# Patient Record
Sex: FEMALE | Race: OTHER | HISPANIC OR LATINO | URBAN - METROPOLITAN AREA
[De-identification: names, ages, dates, MRNs, and addresses within clinical notes are randomized per-mention and may not be internally consistent; named-entity substitution may affect disease eponyms.]

---

## 2018-03-27 ENCOUNTER — EMERGENCY (EMERGENCY)
Facility: HOSPITAL | Age: 33
LOS: 1 days | Discharge: HOME | End: 2018-03-27
Attending: EMERGENCY MEDICINE | Admitting: EMERGENCY MEDICINE
Payer: COMMERCIAL

## 2018-03-27 VITALS
RESPIRATION RATE: 18 BRPM | HEART RATE: 66 BPM | TEMPERATURE: 98 F | OXYGEN SATURATION: 97 % | DIASTOLIC BLOOD PRESSURE: 72 MMHG | SYSTOLIC BLOOD PRESSURE: 122 MMHG

## 2018-03-27 VITALS
OXYGEN SATURATION: 100 % | TEMPERATURE: 98 F | HEART RATE: 86 BPM | DIASTOLIC BLOOD PRESSURE: 99 MMHG | RESPIRATION RATE: 16 BRPM | SYSTOLIC BLOOD PRESSURE: 158 MMHG

## 2018-03-27 LAB
APPEARANCE UR: CLEAR — SIGNIFICANT CHANGE UP
BACTERIA # UR AUTO: ABNORMAL /HPF
BILIRUB UR-MCNC: NEGATIVE — SIGNIFICANT CHANGE UP
COLOR SPEC: YELLOW — SIGNIFICANT CHANGE UP
DIFF PNL FLD: ABNORMAL
EPI CELLS # UR: SIGNIFICANT CHANGE UP /HPF
GLUCOSE UR QL: NEGATIVE — SIGNIFICANT CHANGE UP
HCG UR QL: NEGATIVE — SIGNIFICANT CHANGE UP
KETONES UR-MCNC: ABNORMAL
LEUKOCYTE ESTERASE UR-ACNC: ABNORMAL
NITRITE UR-MCNC: NEGATIVE — SIGNIFICANT CHANGE UP
PH UR: 5.5 — SIGNIFICANT CHANGE UP (ref 5–8)
PROT UR-MCNC: SIGNIFICANT CHANGE UP
RBC CASTS # UR COMP ASSIST: SIGNIFICANT CHANGE UP /HPF (ref 0–2)
SP GR SPEC: 1.03 — HIGH (ref 1.01–1.02)
UROBILINOGEN FLD QL: NEGATIVE — SIGNIFICANT CHANGE UP
WBC UR QL: SIGNIFICANT CHANGE UP /HPF (ref 0–5)

## 2018-03-27 PROCEDURE — 81025 URINE PREGNANCY TEST: CPT

## 2018-03-27 PROCEDURE — 99284 EMERGENCY DEPT VISIT MOD MDM: CPT | Mod: 25

## 2018-03-27 PROCEDURE — 81001 URINALYSIS AUTO W/SCOPE: CPT

## 2018-03-27 PROCEDURE — 87086 URINE CULTURE/COLONY COUNT: CPT

## 2018-03-27 PROCEDURE — 76770 US EXAM ABDO BACK WALL COMP: CPT

## 2018-03-27 PROCEDURE — 76770 US EXAM ABDO BACK WALL COMP: CPT | Mod: 26

## 2018-03-27 RX ORDER — ACETAMINOPHEN 500 MG
650 TABLET ORAL ONCE
Qty: 0 | Refills: 0 | Status: COMPLETED | OUTPATIENT
Start: 2018-03-27 | End: 2018-03-27

## 2018-03-27 RX ADMIN — Medication 650 MILLIGRAM(S): at 04:12

## 2018-03-27 NOTE — ED PROVIDER NOTE - OBJECTIVE STATEMENT
32F with no PMH presenting with right flank pain x4 hrs radiating to groin with associated frequency, urgency, difficulty urinating. States that she feels like she has not urinated much in the past few days. Denies fever, chills, cp, sob, n/v/d, dizziness, headache, vaginal bleeding or discharge

## 2018-03-27 NOTE — ED PROVIDER NOTE - PROGRESS NOTE DETAILS
Maria Luisa: patient stating pain improved Maria Luisa: patient stating pain improved. US significant for kidney stone. Patient made aware. Stable for discharge

## 2018-03-27 NOTE — ED PROCEDURE NOTE - PROCEDURE ADDITIONAL DETAILS
POCUS of kidneys performed. Right kidney with minimal hydronephrosis. No evidence of left kidney hydronephrosis. Renal US to follow.

## 2018-03-27 NOTE — ED ADULT NURSE NOTE - OBJECTIVE STATEMENT
pt c/o "right sided flank pain that started at 2400 tonight and got progressively worse. I took advil and took a hot shower with no relief. For the past few days I havent been urinating. Now I have urgency/frequency when I urinate." pt denies lightheadedness, dizziness, chest pain, SOB, n/v/d, any burning/itching with urination at present.

## 2018-03-27 NOTE — ED ADULT NURSE NOTE - CHPI ED SYMPTOMS NEG
no abdominal distension/no burning urination/no vomiting/no hematuria/no fever/no nausea/no blood in stool/no chills/no diarrhea

## 2018-03-27 NOTE — ED PROVIDER NOTE - ATTENDING CONTRIBUTION TO CARE
Attending MD Vigil:  I personally have seen and examined this patient.  Resident note reviewed and agree on plan of care and except where noted.  See MDM for details.

## 2018-03-27 NOTE — ED PROVIDER NOTE - MEDICAL DECISION MAKING DETAILS
32F presenting with difficulty urinating, frequency, urgency. Likely UTI. Unlikely pyelo given benign exam, no fever. Will order ua, uc, reassess 32F presenting with difficulty urinating, frequency, urgency. Likely UTI. Unlikely pyelo given benign exam, no fever. Will order ua, uc, reassess    Yaritza CLEMENT: 31 y/o female w/o PMH here with acute R flank pain. Patient reports acute onset of pain described as throbbing while at rest that she never felt before and is associated with urinary frequency. Pain is not worsened or improved by anything. Reports mildly dereased UOP for the past 3 days. Denies fever, chills. dysuria, trauma, vag bleeding, vag D/C, hx of STD, abd distention or skin rash. Exam shows a female  in mild discomfort with R CVA tenderness and distractable RLQ tenderness w/o guarding or rebound. Lungs CTAB. CArdiac S1S2 noted, RRR. Abd soft and nontender and nondistended. No LE edema. Consider UTI. Pyelo, Kidney stone or Ovarian pathology. Eval for Pregnancy. Plan UA, US renal and UCG and pain control. Reassess

## 2018-03-28 LAB
CULTURE RESULTS: NO GROWTH — SIGNIFICANT CHANGE UP
SPECIMEN SOURCE: SIGNIFICANT CHANGE UP

## 2020-10-28 NOTE — ED ADULT NURSE NOTE - NS ED NURSE LEVEL OF CONSCIOUSNESS SPEECH
Speaking Coherently O-L Flap Text: The defect edges were debeveled with a #15 scalpel blade.  Given the location of the defect, shape of the defect and the proximity to free margins an O-L flap was deemed most appropriate.  Using a sterile surgical marker, an appropriate advancement flap was drawn incorporating the defect and placing the expected incisions within the relaxed skin tension lines where possible.    The area thus outlined was incised deep to adipose tissue with a #15 scalpel blade.  The skin margins were undermined to an appropriate distance in all directions utilizing iris scissors.

## 2023-01-10 NOTE — ED PROVIDER NOTE - CPE EDP NEURO NORM
Rico De presents today for routine OB visit at 33w6d  Blood Pressure: 122/76  Vh=912 kg (258 lb 12 8 oz); Body mass index is 47 34 kg/m² ; TWG=-2 359 kg (-5 lb 3 2 oz)  Fetal Heart Rate: 140  Abdomen: gravid, soft, non-tender  NST is reactive now  She reports no complaints  Reports occasional mild uterine contractions  Denies vaginal bleeding or leaking of fluid  Reports adequate fetal movement of at least 10 movements in 2 hours once daily  Scheduled for ultrasound 1/31/23  Reviewed premature labor precautions and fetal kick counts  Advised to continue medications and return in 3 days  Current Outpatient Medications   Medication Instructions   • aspirin (ECOTRIN LOW STRENGTH) 162 mg, Oral, Daily   • labetalol (NORMODYNE) 800 mg, Oral, Every 8 hours   • NIFEdipine (PROCARDIA XL) 30 mg, Oral, Daily   • Prenatal Vit-Fe Fumarate-FA (Prenatal Vitamin) 27-0 8 MG TABS 1 tablet, Oral, Daily       Laboratory workup: initial OB labs (done 7/16/22); 28 week labs (done 11/27/22)    Genetic Screening: NIPS negative, MSAFP negative    Vaccinations: influenza (declined 9/12/22);  Tdap (declined 12/13/22); COVID-19 (recommended 7/12/22, declined 7/25/22)    Postpartum contraception: desires surgical sterilization (Doreen Joslyn signed 11/29/22)    Fetal Ultrasounds:  7/12/22 (7w6d) EDC confirmed  8/17/22 (13w0d) NT WNL  9/9/22 (16w2d) CL=4 40cm  9/26/22 (18w5d) CL=4 04cm  10/4/22 (19w6d) no previa, CL=4 13cm, marshall WNL w/missed views  10/18/22 (21w6d) CL=4 69cm  12/13/22 (29w6d) EFW=20%, AC=15%, DG=11 8cm, marshall WNL w/missed views  1/10/23 (33w6d) EFW=32%, AC=36%, DG=13 7cm, marshall WNL w/missed views, vertex      G7 Problems (from 07/12/22 to present)     Problem Noted Resolved    Abnormal glucola 7/25/2022 by JESSE Oneal No    Overview Addendum 11/29/2022  9:09 AM by JESSE Oneal     Needs 3h GTT - done WNL  Needs repeat 3h GTT @28 weeks - done WNL         Rubella non-immune 7/19/2022 by JESSE Brown No    Overview Signed 7/19/2022 11:46 AM by JESSE Dill     Needs postpartum MMR         Hx of preeclampsia 7/12/2022 by JESSE Brwon No    Overview Addendum 7/25/2022 10:57 AM by JESSE Dill     Needs baseline PEC labs - done WNL  Needs LDASA tx - taking         Chronic hypertension during pregnancy 7/12/2022 by JESSE Brown No    Overview Addendum 12/30/2022  9:20 AM by JESSE Brown     Needs baseline PEC labs - done WNL  Needs LDASA tx  Switched from HCTZ to Labetalol 200mg BID on 7/12/22  Increased Labetalol to 400mg BID on 7/25/22  Increased Labetalol to 600mg BID on 7/29/22  Increased Labetalol to 800mg BID on 8/2/22  Increased Labetalol to 800mg Q8h on 8/5/22  Added Procardia XL30mg QD on 8/9/22  Increased Procardia XL to 60mg QD on 8/12/22  Decreased Procardia XL to 30mg QD on 8/30/22  Monitoring BP's at home daily - in progress  Serial growth scans  Needs AFS @32 weeks - in progress  Needs delivery @37 weeks         History of GDM 7/12/2022 by JESSE Brown No    Overview Addendum 8/15/2022  8:33 AM by JESSE Dill     Needs early GDM screen - done WNL         Obesity affecting pregnancy 7/12/2022 by JESSE Brown No    Overview Addendum 12/30/2022  9:20 AM by JESSE Brown     Pre-pregnant BMI=46 78  Needs early GDM screen - done WNL  Serial growth scans  Needs AFS @32 weeks - in progress         History of PTD @32 weeks 7/12/2022 by JESSE Brown No    Overview Addendum 11/29/2022  9:09 AM by JESSE Brown     Needs MFM consultation - done 8/17/22  Declines progesterone therapy  Serial CL measurements - done WNL normal...